# Patient Record
(demographics unavailable — no encounter records)

---

## 2025-05-29 NOTE — PLAN
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 - CT scan discussed with the patient today. - Discussed treatment options with patient today - Risks and complications discussed today.

## 2025-05-29 NOTE — HISTORY OF PRESENT ILLNESS
[de-identified] : 59 yro pt referred by ENT Dr. 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 PT states she experienced very bad sharp pain in the R throat about 10 months ago and she thought it was going to be an episode of laryngitis. She felt pain radiate to R ear and she felt something swollen in R neck. She saw Dr. Issa who ordered US which showed possible TGDC.  No biopsy done.  Pt had a CT neck done at [facility] [place] on 6/18/24 which showed a 0.9 x 2.6 x 2.8 cm TDC.   No changes since last visit. Sometimes has dysphagia with eating dry foods but quickly subsides.  Patient denies throat/neck pain, globus sensation, odynophagia, dyspnea, dysphonia, hemoptysis or otalgia. Denies recent fevers/infections, chills, night sweats, unintentional weight loss  Smoked socially from age 18-21. Current non-smoker.

## 2025-05-29 NOTE — CONSULT LETTER
[Dear  ___] : Dear  [unfilled], [Consult Letter:] : I had the pleasure of evaluating your patient, [unfilled]. [Please see my note below.] : Please see my note below. [Consult Closing:] : Thank you very much for allowing me to participate in the care of this patient.  If you have any questions, please do not hesitate to contact me. [Sincerely,] : Sincerely, [FreeTextEntry2] : Dr Matt Issa [FreeTextEntry3] : Raz Jeong MD, FACS  Otolaryngology-Head and Neck Surgery [place] [facility] [employer] [facility]

## 2025-05-29 NOTE — PHYSICAL EXAM
[Midline] : trachea located in midline position [Normal] : no rashes [de-identified] : Midline neck mass